# Patient Record
Sex: MALE | Race: WHITE | NOT HISPANIC OR LATINO | Employment: FULL TIME | URBAN - METROPOLITAN AREA
[De-identification: names, ages, dates, MRNs, and addresses within clinical notes are randomized per-mention and may not be internally consistent; named-entity substitution may affect disease eponyms.]

---

## 2022-07-05 ENCOUNTER — HOSPITAL ENCOUNTER (OUTPATIENT)
Dept: RADIOLOGY | Facility: HOSPITAL | Age: 47
Discharge: HOME | End: 2022-07-05
Attending: PODIATRIST
Payer: COMMERCIAL

## 2022-07-05 ENCOUNTER — OFFICE VISIT (OUTPATIENT)
Dept: PODIATRY | Facility: CLINIC | Age: 47
End: 2022-07-05
Payer: COMMERCIAL

## 2022-07-05 DIAGNOSIS — L60.3 ONYCHODYSTROPHY: ICD-10-CM

## 2022-07-05 DIAGNOSIS — M20.41 HAMMER TOE OF RIGHT FOOT: ICD-10-CM

## 2022-07-05 DIAGNOSIS — M79.672 ACUTE FOOT PAIN, LEFT: Primary | ICD-10-CM

## 2022-07-05 DIAGNOSIS — M25.571 ACUTE RIGHT ANKLE PAIN: ICD-10-CM

## 2022-07-05 PROCEDURE — 99999 PR PBB SHADOW E&M-NEW PATIENT-LVL II: CPT | Mod: PBBFAC,,, | Performed by: PODIATRIST

## 2022-07-05 PROCEDURE — 4010F PR ACE/ARB THEARPY RXD/TAKEN: ICD-10-PCS | Mod: CPTII,S$GLB,, | Performed by: PODIATRIST

## 2022-07-05 PROCEDURE — 73610 X-RAY EXAM OF ANKLE: CPT | Mod: TC,LT

## 2022-07-05 PROCEDURE — 1160F RVW MEDS BY RX/DR IN RCRD: CPT | Mod: CPTII,S$GLB,, | Performed by: PODIATRIST

## 2022-07-05 PROCEDURE — 73610 XR ANKLE COMPLETE 3 VIEW LEFT: ICD-10-PCS | Mod: 26,LT,, | Performed by: RADIOLOGY

## 2022-07-05 PROCEDURE — 1160F PR REVIEW ALL MEDS BY PRESCRIBER/CLIN PHARMACIST DOCUMENTED: ICD-10-PCS | Mod: CPTII,S$GLB,, | Performed by: PODIATRIST

## 2022-07-05 PROCEDURE — 4010F ACE/ARB THERAPY RXD/TAKEN: CPT | Mod: CPTII,S$GLB,, | Performed by: PODIATRIST

## 2022-07-05 PROCEDURE — 99203 PR OFFICE/OUTPT VISIT, NEW, LEVL III, 30-44 MIN: ICD-10-PCS | Mod: S$GLB,,, | Performed by: PODIATRIST

## 2022-07-05 PROCEDURE — 1159F MED LIST DOCD IN RCRD: CPT | Mod: CPTII,S$GLB,, | Performed by: PODIATRIST

## 2022-07-05 PROCEDURE — 1159F PR MEDICATION LIST DOCUMENTED IN MEDICAL RECORD: ICD-10-PCS | Mod: CPTII,S$GLB,, | Performed by: PODIATRIST

## 2022-07-05 PROCEDURE — 99203 OFFICE O/P NEW LOW 30 MIN: CPT | Mod: S$GLB,,, | Performed by: PODIATRIST

## 2022-07-05 PROCEDURE — 99999 PR PBB SHADOW E&M-NEW PATIENT-LVL II: ICD-10-PCS | Mod: PBBFAC,,, | Performed by: PODIATRIST

## 2022-07-05 PROCEDURE — 73610 X-RAY EXAM OF ANKLE: CPT | Mod: 26,LT,, | Performed by: RADIOLOGY

## 2022-07-05 NOTE — PROGRESS NOTES
Subjective:       Patient ID: Kaushal Michael is a 46 y.o. male.    Chief Complaint: Foot Problem (Patients complains of swelling randomly to left ankle. He denies pain at present and states his left hallux nail causes mild discomfort at times while at work. /)      HPI: Kaushal Michael presents to the office with the chief complaint of occasional intermittent pains to the left foot and ankle.  States that pain has mostly resolved.  He denies any trauma or injury.  Does state occasional swelling of her left ankle after long periods of ambulation weight-bearing periods denies any falls or injuries.  Also concerned thickened discoloration to the left hallux nail.  No signs ingrowing medial or lateral borders.  Patient's Primary Care Provider is Ilene Lester NP.     Review of patient's allergies indicates:  No Known Allergies    History reviewed. No pertinent past medical history.    History reviewed. No pertinent family history.    Social History     Socioeconomic History    Marital status:        No past surgical history on file.    Review of Systems      Objective:   There were no vitals taken for this visit.    X-Ray Ankle Complete Left  Narrative: EXAMINATION:  XR ANKLE COMPLETE 3 VIEW LEFT    CLINICAL HISTORY:  Pain in right ankle and joints of right foot    TECHNIQUE:  AP, lateral and oblique views of the left ankle were performed.    COMPARISON:  None    FINDINGS:  No acute fracture or dislocation.  Dorsal and plantar calcaneal enthesophytes are noted.  Mild spurring associated with the medial malleolus.  Pes planus deformity noted.  Mild-to-moderate degenerative changes noted in the region of the midfoot.  Impression: As above    Electronically signed by: Tc Diallo DO  Date:    07/05/2022  Time:    14:41      Physical Exam    LOWER EXTREMITY PHYSICAL EXAMINATION    VASCULAR: Pulses are palpable to the B/L lower extremity. The right dorsalis pedis pulse is 2/4 and the posterior tibial pulse is  2/4. The left dorsalis pedis pulse is 2/4 and the posterior tibial pulse is 2/4. Hair growth is noted on the dorsal foot and digits. Proximal to distal, warm to warm. Capillary refill time is WNL at less than 3s.    DERMATOLOGY: Skin is supple, dry and intact. No ecchymosis is noted. No hypertrophic skin formation. No erythema or cellulitis is noted.     ORTHOPEDIC:  Pes planus foot deformity noted to the bilateral foot.  No pain on palpation the medial aspect of the foot bilaterally.  No pain with palpation of the sinus tarsi.  No difficulties with inversion eversion.  Asymptomatic bunion deformity noted to the right foot.  There is also noted to be a slight hammertoe deformity noted to the right 2nd toe.    NEUROLOGY: Protective sensation is intact via 5.07 [place] monofilament. Proprioception is intact. Sensation to light touch is intact. Upon palpation of the interspaces, there are no neurological sensations stated that radiate proximal or distal. Upon compression of the metatarsal heads from medial to lateral, no neurological sensations or symptoms are stated.    Assessment:     1. Acute foot pain, left    2. Hammer toe of right foot    3. Onychodystrophy        Plan:     Acute foot pain, left  -     X-Ray Foot Complete 3 view Bilateral; Future; Expected date: 07/05/2022  -     X-Ray Ankle Complete Left; Future; Expected date: 07/05/2022    Hammer toe of right foot    Onychodystrophy      Thorough discussion is had with the patient today, concerning the diagnosis, its etiology, and the treatment algorithm at present.    X-rays were taken today and reviewed by myself with the patient room.  No acute fracture dislocation is noted.  Pes planus foot deformity is noted.  No signs of fractures or dislocation    We did discuss with patient that as he is currently asymptomatic, we will continue to monitor and watch his foot.  As he is in 0/10 pain, patient may continue to wear flat shoes discomfort.    This  patient does have hammertoe (digital) contractures. I did advise the patient to ambulate with shoe gear that is high in the tox box to allow for extra room and depth in the sagittal plane, in order to alleviate and lessen the potential for dorsal digital break down at the IPJs. I do also recommended shoe gear that is soft and supple in the foot bed as to lessen the potential for plantar distal digital break down at the contracted digits. If the patient does not feel the aforementioned is necessary, he or she may also purchase OTC padding devices to be worn across the MTPJ, at the distal aspects of the digits, and/or at the dorsal aspects of the IPJs. The patient does acknowledge understanding and is said to be amenable to compliance.    Slight discoloration and thickening to the hallux nail no acute signs of infection is noted.  Discussed the importance of filing an trim the nail to the appropriate thickness       No future appointments.